# Patient Record
Sex: MALE | Race: BLACK OR AFRICAN AMERICAN | NOT HISPANIC OR LATINO | URBAN - METROPOLITAN AREA
[De-identification: names, ages, dates, MRNs, and addresses within clinical notes are randomized per-mention and may not be internally consistent; named-entity substitution may affect disease eponyms.]

---

## 2018-06-09 ENCOUNTER — EMERGENCY (EMERGENCY)
Facility: HOSPITAL | Age: 28
LOS: 1 days | Discharge: HOME | End: 2018-06-09
Attending: EMERGENCY MEDICINE
Payer: COMMERCIAL

## 2018-06-09 VITALS
HEART RATE: 70 BPM | RESPIRATION RATE: 20 BRPM | DIASTOLIC BLOOD PRESSURE: 86 MMHG | SYSTOLIC BLOOD PRESSURE: 130 MMHG | OXYGEN SATURATION: 100 %

## 2018-06-09 VITALS
OXYGEN SATURATION: 99 % | TEMPERATURE: 97 F | HEART RATE: 68 BPM | SYSTOLIC BLOOD PRESSURE: 121 MMHG | WEIGHT: 229.94 LBS | RESPIRATION RATE: 16 BRPM | DIASTOLIC BLOOD PRESSURE: 82 MMHG

## 2018-06-09 LAB
ALBUMIN SERPL ELPH-MCNC: 4.7 G/DL — SIGNIFICANT CHANGE UP (ref 3.3–5)
ALP SERPL-CCNC: 114 U/L — SIGNIFICANT CHANGE UP (ref 40–120)
ALT FLD-CCNC: 28 U/L — SIGNIFICANT CHANGE UP (ref 10–45)
ANION GAP SERPL CALC-SCNC: 14 MMOL/L — SIGNIFICANT CHANGE UP (ref 5–17)
AST SERPL-CCNC: 21 U/L — SIGNIFICANT CHANGE UP (ref 10–40)
BASOPHILS # BLD AUTO: 0 K/UL — SIGNIFICANT CHANGE UP (ref 0–0.2)
BASOPHILS NFR BLD AUTO: 0.4 % — SIGNIFICANT CHANGE UP (ref 0–2)
BILIRUB SERPL-MCNC: 0.3 MG/DL — SIGNIFICANT CHANGE UP (ref 0.2–1.2)
BLOOD GAS SOURCE: SIGNIFICANT CHANGE UP
BUN SERPL-MCNC: 11 MG/DL — SIGNIFICANT CHANGE UP (ref 7–23)
CALCIUM SERPL-MCNC: 9.9 MG/DL — SIGNIFICANT CHANGE UP (ref 8.4–10.5)
CHLORIDE SERPL-SCNC: 98 MMOL/L — SIGNIFICANT CHANGE UP (ref 96–108)
CO2 SERPL-SCNC: 26 MMOL/L — SIGNIFICANT CHANGE UP (ref 22–31)
COHGB MFR BLDV: 0.9 % — SIGNIFICANT CHANGE UP (ref 0–1.5)
CREAT SERPL-MCNC: 0.93 MG/DL — SIGNIFICANT CHANGE UP (ref 0.5–1.3)
EOSINOPHIL # BLD AUTO: 0.2 K/UL — SIGNIFICANT CHANGE UP (ref 0–0.5)
EOSINOPHIL NFR BLD AUTO: 2 % — SIGNIFICANT CHANGE UP (ref 0–6)
GLUCOSE SERPL-MCNC: 92 MG/DL — SIGNIFICANT CHANGE UP (ref 70–99)
HCT VFR BLD CALC: 46.1 % — SIGNIFICANT CHANGE UP (ref 39–50)
HGB BLD CALC-MCNC: 14.8 G/DL — SIGNIFICANT CHANGE UP (ref 13–17)
HGB BLD-MCNC: 14.9 G/DL — SIGNIFICANT CHANGE UP (ref 13–17)
LYMPHOCYTES # BLD AUTO: 2.6 K/UL — SIGNIFICANT CHANGE UP (ref 1–3.3)
LYMPHOCYTES # BLD AUTO: 31 % — SIGNIFICANT CHANGE UP (ref 13–44)
MCHC RBC-ENTMCNC: 32.4 GM/DL — SIGNIFICANT CHANGE UP (ref 32–36)
MCHC RBC-ENTMCNC: 33.6 PG — SIGNIFICANT CHANGE UP (ref 27–34)
MCV RBC AUTO: 104 FL — HIGH (ref 80–100)
MONOCYTES # BLD AUTO: 0.7 K/UL — SIGNIFICANT CHANGE UP (ref 0–0.9)
MONOCYTES NFR BLD AUTO: 8.5 % — SIGNIFICANT CHANGE UP (ref 2–14)
NEUTROPHILS # BLD AUTO: 5 K/UL — SIGNIFICANT CHANGE UP (ref 1.8–7.4)
NEUTROPHILS NFR BLD AUTO: 58.2 % — SIGNIFICANT CHANGE UP (ref 43–77)
PLATELET # BLD AUTO: 249 K/UL — SIGNIFICANT CHANGE UP (ref 150–400)
POTASSIUM SERPL-MCNC: 4.4 MMOL/L — SIGNIFICANT CHANGE UP (ref 3.5–5.3)
POTASSIUM SERPL-SCNC: 4.4 MMOL/L — SIGNIFICANT CHANGE UP (ref 3.5–5.3)
PROT SERPL-MCNC: 8.3 G/DL — SIGNIFICANT CHANGE UP (ref 6–8.3)
RBC # BLD: 4.44 M/UL — SIGNIFICANT CHANGE UP (ref 4.2–5.8)
RBC # FLD: 11.4 % — SIGNIFICANT CHANGE UP (ref 10.3–14.5)
SODIUM SERPL-SCNC: 138 MMOL/L — SIGNIFICANT CHANGE UP (ref 135–145)
WBC # BLD: 8.6 K/UL — SIGNIFICANT CHANGE UP (ref 3.8–10.5)
WBC # FLD AUTO: 8.6 K/UL — SIGNIFICANT CHANGE UP (ref 3.8–10.5)

## 2018-06-09 PROCEDURE — 85027 COMPLETE CBC AUTOMATED: CPT

## 2018-06-09 PROCEDURE — 86140 C-REACTIVE PROTEIN: CPT

## 2018-06-09 PROCEDURE — 82480 ASSAY SERUM CHOLINESTERASE: CPT

## 2018-06-09 PROCEDURE — 99283 EMERGENCY DEPT VISIT LOW MDM: CPT

## 2018-06-09 PROCEDURE — 82375 ASSAY CARBOXYHB QUANT: CPT

## 2018-06-09 PROCEDURE — 99284 EMERGENCY DEPT VISIT MOD MDM: CPT

## 2018-06-09 PROCEDURE — 80053 COMPREHEN METABOLIC PANEL: CPT

## 2018-06-09 NOTE — ED PROVIDER NOTE - RESPIRATORY, MLM
Breath sounds clear and equal bilaterally. Breath sounds clear and equal bilaterally. **ATTENDING ADDENDUM (Dr. Mark Keith): NO wheezing, rales, rhonchi, crackles, stridor, drooling, retractions, nasal flaring, or tripoding.

## 2018-06-09 NOTE — ED PROVIDER NOTE - EYES, MLM
Clear bilaterally, pupils equal, round and reactive to light. Clear bilaterally, pupils equal, round and reactive to light. **ATTENDING ADDENDUM (Dr. Mark Keith): Extraocular muscle movements intact. Clear corneas bilaterally, pupils equal and round. NO nystagmus.

## 2018-06-09 NOTE — ED ADULT NURSE REASSESSMENT NOTE - NS ED NURSE REASSESS COMMENT FT1
2135- patient remains feeling alright. Cleared for discharge by Md. Fields provided. well tolerated.

## 2018-06-09 NOTE — ED PROVIDER NOTE - OBJECTIVE STATEMENT
28yoM no pmh pw fume exposure while on an airplane for about 45 minutes prior to landing. Patient endorsing some mild headache and feeling dizziness during the exposure that has no improved. Patient received oxygen via face mask for about 30 minutes prior to arrival. currently patient has no complaints. denies nausea, vomiting, diarrhea, chest pain, sob, weakness, numbness, tingling, trouble walking, salivation, lacrimation or other issues. 28yoM no pmh pw fume exposure while on an airplane for about 45 minutes prior to landing. Patient endorsing some mild headache and feeling dizziness during the exposure that has now improved. Patient received oxygen via face mask for about 30 minutes prior to arrival. currently patient has no complaints. denies nausea, vomiting, diarrhea, chest pain, sob, weakness, numbness, tingling, trouble walking, salivation, lacrimation or other issues.  **ATTENDING ADDENDUM (Dr. Mark Keith): I attest that I have directly and personally interviewed and examined this patient and elicited a comparable history of present illness and review of systems as documented, along with my EM resident. I attest that I have made significant contributions to the documentation where necessary and as noted in the EMR.

## 2018-06-09 NOTE — ED ADULT NURSE NOTE - OBJECTIVE STATEMENT
This 28 male in ED via EMS with c/o dizziness and headache s/p inhalation of noxious smell best described as smelly feet in the flight X45 minutes. Pt stephanie now feels better

## 2018-06-09 NOTE — ED PROVIDER NOTE - PHYSICAL EXAMINATION
**ATTENDING ADDENDUM (Dr. Mark Keith): I have reviewed and substantially contributed to the elements of the PE as documented above. I have directly performed an examination of this patient in conjunction with the other members (EM resident/PA/NP) of the patient care team. NO clinical evidence of cholinergic/anticholinergic/sympathomimetic/sympatholytic/opioid/withdrawal toxidrome at this time.

## 2018-06-09 NOTE — ED PROVIDER NOTE - ATTENDING CONTRIBUTION TO CARE
**ATTENDING ADDENDUM (Dr. Mark Keith): I attest that I have directly examined this patient and reviewed and formulated the diagnostic and therapeutic management plan in collaboration with the EM resident. Please see MDM note and remainder of EMR for findings from CC, HPI, ROS, and PE. (Dean)

## 2018-06-09 NOTE — ED PROVIDER NOTE - PROGRESS NOTE DETAILS
**ATTENDING ADDENDUM (Dr. Mark Keith): patient serially evaluated throughout ED course. NO acute deterioration up to this time in the ED. NO evidence of toxidrome at this time. Anticipatory guidance provided. Agree with discharge home with close outpatient followup with primary care physician/provider and with medications (if appropriate, if clinically indicated, and as prescribed, as noted in EMR).

## 2018-06-09 NOTE — ED PROVIDER NOTE - MUSCULOSKELETAL, MLM
Spine appears normal, range of motion is not limited, no muscle or joint tenderness Spine appears normal, range of motion is not limited, no muscle or joint tenderness **ATTENDING ADDENDUM (Dr. Mark Keith): NO tremors. NO clonus. NO seizure.

## 2018-06-09 NOTE — ED PROVIDER NOTE - CONDUCTED A DETAILED DISCUSSION WITH PATIENT AND/OR GUARDIAN REGARDING, MDM
return to ED if symptoms worsen, persist or questions arise/need for outpatient follow-up/**ATTENDING ADDENDUM (Dr. Mark Keith): Anticipatory guidance provided./lab results

## 2018-06-09 NOTE — ED PROVIDER NOTE - MEDICAL DECISION MAKING DETAILS
28yoM pw headache after exposure to fumes. now sympomts improved. total neuro exam wnl. no concern for organophosphate exposure. will check labs, carboxyhemoglobin and reass. likely dc. 28yoM pw headache after exposure to fumes. now sympomts improved. total neuro exam wnl. no concern for organophosphate exposure. will check labs, carboxyhemoglobin and reassess. likely dc.  **ATTENDING MEDICAL DECISION MAKING/SYNTHESIS (Dr. Mark Keith): I have reviewed the Chief Complaint, the HPI, the ROS, and have directly performed and confirmed the findings on the Physical Examination. I have reviewed the medical decision making with all providers, as applicable. 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 The MOST LIKELY DIAGNOSIS, and the LIST OF DIFFERENTIAL DIAGNOSES, includes (but is not limited to) the following: toxic exposure e.g. carbon monoxide, organophosphate, or equivalent (NO evidence), bronchospasm/angioedema or equivalent (NO evidence), chemical/thermal inhaled exposure to toxicant/asphyxiant (NO evidence), electrolyte-metabolic-endocrine derangements, dehydration, arrhythmia (NO evidence). The likelihood of each of these diagnoses has been appropriately considered in the context of this patient's presentation and my evaluation. PLAN: as described in EMR, including diagnostics, therapeutics and consultation as clinically warranted. I will continue to reevaluate the patient, including the results of all testing, and monitor response to therapy throughout the patient's course in the ED.

## 2018-06-09 NOTE — ED PROVIDER NOTE - GASTROINTESTINAL, MLM
Abdomen soft, non-tender, no guarding. Abdomen soft, non-tender **ATTENDING ADDENDUM (Dr. Mark Keith): non-distended. NO guarding, rebound, or rigidity. NO pulsatile or non-pulsatile masses. NO hernias. NO obvious hepatosplenomegaly.

## 2018-06-09 NOTE — ED PROVIDER NOTE - TIMING
gradual onset/**ATTENDING ADDENDUM (Dr. Mark Keith): now resolved at time of ED team and attending evaluation.

## 2018-06-09 NOTE — ED PROVIDER NOTE - CHPI ED SYMPTOMS NEG
no abdominal distension/no abdominal pain/no disorientation/no vomiting/no nausea/no pain/no fever/no confusion/no weakness/**ATTENDING ADDENDUM (Dr. Mark Keith): NO chest pain, palpitations, shortness of breath, dyspnea on exertion, visual or speech changes, focal or generalized weakness, or numbness, tingling, weakness, or paresthesias.

## 2018-06-09 NOTE — ED PROVIDER NOTE - PLAN OF CARE
1) Follow up with your doctor in 1 week. Call tomorrow for an appointment.   2) Return to the ER immediately for new or worsening symptoms including nausea, vomiting, chest pain, weakness, trouble walking or any other issues.   3) Drink plenty of fluids. Take Tylenol 975 mg every 6 hours as needed for headaches. ATTENDING ADDENDUM (Dr. Mark Keith): Goals of care include resolution of emergent/urgent symptoms and concerns, and restoration to baseline level of homeostasis.

## 2018-06-09 NOTE — ED PROVIDER NOTE - CARE PLAN
Principal Discharge DX:	Headache  Assessment and plan of treatment:	1) Follow up with your doctor in 1 week. Call tomorrow for an appointment.   2) Return to the ER immediately for new or worsening symptoms including nausea, vomiting, chest pain, weakness, trouble walking or any other issues.   3) Drink plenty of fluids. Take Tylenol 975 mg every 6 hours as needed for headaches. Principal Discharge DX:	Headache  Goal:	ATTENDING ADDENDUM (Dr. Mark Keith): Goals of care include resolution of emergent/urgent symptoms and concerns, and restoration to baseline level of homeostasis.  Assessment and plan of treatment:	1) Follow up with your doctor in 1 week. Call tomorrow for an appointment.   2) Return to the ER immediately for new or worsening symptoms including nausea, vomiting, chest pain, weakness, trouble walking or any other issues.   3) Drink plenty of fluids. Take Tylenol 975 mg every 6 hours as needed for headaches.  Secondary Diagnosis:	Exposure to chemical inhalation

## 2018-06-09 NOTE — ED PROVIDER NOTE - CHIEF COMPLAINT
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

## 2018-06-10 LAB — CRP SERPL-MCNC: 1.5 MG/DL — HIGH (ref 0–0.4)

## 2018-06-10 NOTE — ED POST DISCHARGE NOTE - ADDITIONAL DOCUMENTATION
6/10/18: Pt returned call, informed patient of elevated CRP and need for follow up. Pt currently feels fine, denies any symptoms at this time. Pt is , currently resides in [place]. PTs bloodwork faxed to  PMD Sebastian Wood ([facility]) fax number  phone number . Fax completed and successfully sent. -Clarisse Strickland PA-C

## 2018-06-10 NOTE — ED POST DISCHARGE NOTE - OTHER COMMUNICATION
6/12/18: pt called back states his PMDs office never got the fax with pts CRP result, would like it re-faxed. confirmed PMDs fax number, resent fax, fax confirmation received. Brenda Hazel

## 2018-06-10 NOTE — ED POST DISCHARGE NOTE - DETAILS
6/10/18: attempted to contact patient, Left message and call back number. Pt contacted to ensure follow up with PMD for elevated CRP likely secondary to acute environmental exposure. -Clarisse Strickland PA-C 6/10/18: attempted to contact patient, Left message and call back number. Pt contacted to follow up on patients condition, ensure follow up with PMD for elevated CRP likely secondary to acute environmental exposure. -Clarisse Strickland PA-C 6/10/18: attempted to contact patient, Left message and call back number. Pt contacted to follow up on patients condition, ensure follow up with PMD for elevated CRP  -Clarisse Strickland PA-C

## 2018-06-13 LAB — CHOLINESTERASE BLD-CCNC: 4872 IU/L — SIGNIFICANT CHANGE UP (ref 3334–7031)
